# Patient Record
Sex: FEMALE | Race: BLACK OR AFRICAN AMERICAN | NOT HISPANIC OR LATINO | URBAN - METROPOLITAN AREA
[De-identification: names, ages, dates, MRNs, and addresses within clinical notes are randomized per-mention and may not be internally consistent; named-entity substitution may affect disease eponyms.]

---

## 2021-12-10 ENCOUNTER — EMERGENCY (EMERGENCY)
Facility: HOSPITAL | Age: 36
LOS: 1 days | Discharge: HOME | End: 2021-12-10
Admitting: EMERGENCY MEDICINE
Payer: SELF-PAY

## 2021-12-10 VITALS
DIASTOLIC BLOOD PRESSURE: 87 MMHG | SYSTOLIC BLOOD PRESSURE: 142 MMHG | TEMPERATURE: 99 F | OXYGEN SATURATION: 98 % | HEIGHT: 67 IN | WEIGHT: 259.93 LBS | RESPIRATION RATE: 17 BRPM | HEART RATE: 98 BPM

## 2021-12-10 DIAGNOSIS — M54.50 LOW BACK PAIN, UNSPECIFIED: ICD-10-CM

## 2021-12-10 DIAGNOSIS — D57.00 HB-SS DISEASE WITH CRISIS, UNSPECIFIED: ICD-10-CM

## 2021-12-10 DIAGNOSIS — R11.0 NAUSEA: ICD-10-CM

## 2021-12-10 PROCEDURE — 99053 MED SERV 10PM-8AM 24 HR FAC: CPT

## 2021-12-10 PROCEDURE — 99282 EMERGENCY DEPT VISIT SF MDM: CPT

## 2021-12-10 PROCEDURE — 99284 EMERGENCY DEPT VISIT MOD MDM: CPT

## 2021-12-10 NOTE — ED PROVIDER NOTE - NS ED ROS FT
CONSTITUTIONAL: No fever, chills, or weakness  NEURO: No headache, no dizziness, no syncope; No focal weakness/tingling/numbness  EYES: No visual changes  ENT: No rhinorrhea or sore throat  PULM: No cough or dyspnea  CV: No chest pain or palpitations  GI: HPI  : No dysuria, hematuria, frequency  MSK: HPI  SKIN: no rash or unusual bruising

## 2021-12-10 NOTE — ED ADULT NURSE NOTE - OBJECTIVE STATEMENT
36 year old female A&OX4 HX of Sickle Cell. Takes 500mg Hydroxyurea at home. Recently seen at at [facility] ED for same complaint. Denies chest pain, sob, fever, chills, cough,. n/v/d. Ambulates with a steady gait.

## 2021-12-10 NOTE — ED PROVIDER NOTE - NSFOLLOWUPINSTRUCTIONS_ED_ALL_ED_FT
Follow up with your doctor tomorrow.  Return to the Emergency Department if you have any new or worsening symptoms, or if you have any concerns.  ================    Sickle Cell Crisis    WHAT YOU NEED TO KNOW:    A sickle cell crisis is a painful episode that occurs in people who have sickle cell anemia. It happens when sickle-shaped red blood cells (RBCs) block blood vessels. Blood and oxygen cannot get to tissues, causing pain. A sickle cell crisis can also damage your tissues and cause organ failure, such liver or kidney failure. A sickle cell crisis can become life-threatening.    DISCHARGE INSTRUCTIONS:    Call your local emergency number (911 in the ) if:   •You have shortness of breath or chest pain.      •You are a man and have an erection that is painful and does not go away.      •You lose vision in one or both eyes.      Return to the emergency department if:   •You have a fever.      •You feel like you cannot cope with your pain, or you feel like hurting yourself.      •You have behavior changes, a seizure, or faint.      •You have abdominal pain, nausea, or vomiting.      •You have a headache that is worse or different from those that you have had in the past.      •You have new weakness or numbness in your arm, leg, or face.      •You have new pain in any part of your body.      •Your urine is dark and you are urinating less than usual or not at all.      •You are dizzy, lightheaded, or faint.      Call your doctor if:   •You have any new signs or symptoms.      •You have blood in your urine.      •You are constipated or you have diarrhea.      •You have changes in your vision.      •You have increased fatigue.      •You plan to travel by airplane or to a high [facility].      •You have questions or concerns about your condition or care.      Medicines: You may need any of the following:  •Medicine may be given to decrease sickling of your RBCs. You may also need medicine to treat or prevent a bacterial infection.      •Prescription pain medicine may be given. Ask your healthcare provider how to take this medicine safely. Some prescription pain medicines contain acetaminophen. Do not take other medicines that contain acetaminophen without talking to your healthcare provider. Too much acetaminophen may cause liver damage. Prescription pain medicine may cause constipation. Ask your healthcare provider how to prevent or treat constipation.       •NSAIDs, such as ibuprofen, help decrease swelling, pain, and fever. This medicine is available with or without a doctor's order. NSAIDs can cause stomach bleeding or kidney problems in certain people. If you take blood thinner medicine, always ask your healthcare provider if NSAIDs are safe for you. Always read the medicine label and follow directions.      •Acetaminophen decreases pain and fever. It is available without a doctor's order. Ask how much to take and how often to take it. Follow directions. Read the labels of all other medicines you are using to see if they also contain acetaminophen, or ask your doctor or pharmacist. Acetaminophen can cause liver damage if not taken correctly. Do not use more than 4 grams (4,000 milligrams) total of acetaminophen in one day.       •Take your medicine as directed. Contact your healthcare provider if you think your medicine is not helping or if you have side effects. Tell him or her if you are allergic to any medicine. Keep a list of the medicines, vitamins, and herbs you take. Include the amounts, and when and why you take them. Bring the list or the pill bottles to follow-up visits. Carry your medicine list with you in case of an emergency.      Medical alert identification: Wear medical alert jewelry or carry a card that says you have sickle cell anemia. Ask your healthcare provider where to get these items.    Medical Alert Jewelry         Prevent a sickle cell crisis:   •Take vitamins and minerals as directed. Folic acid may help prevent blood vessel problems that can occur with sickle cell anemia. Zinc may decrease how often you have pain.      •Drink liquids as directed. Dehydration can increase your risk for a sickle cell crisis. Ask how much liquid to drink each day and which liquids are best for you.      •Balance rest and exercise. Rest during a sickle cell crisis. Over time, increase your activity to a moderate amount. Exercise as directed. Avoid exercise or activities that can cause injury, such as football. Ask about the best exercise plan for you.      •Wash your hands frequently. Handwashing can help prevent illness. Wash your hands before you prepare or eat food, and after you use the bathroom.  Handwashing           •Avoid quick changes in temperature. Do not go quickly from a warm place to a cold place. Get in a pool slowly instead of jumping in. Avoid getting too hot or too cold. Dress in light clothing in the summer and warm clothing in the winter.      •Do not smoke cigarettes or drink alcohol. These increase your risk for a sickle cell crisis. Nicotine and other chemicals in cigarettes and cigars can cause lung damage. Ask your healthcare provider for information if you currently smoke and need help to quit. E-cigarettes or smokeless tobacco still contain nicotine. Talk to your healthcare provider before you use these products.      •Ask about vaccines you may need. Vaccines can help prevent a viral infection that may lead to a sickle cell crisis. Get a flu shot as soon as recommended each year, usually in September or October. You may need pneumonia vaccines every 5 years. Your healthcare provider can tell you if you need other vaccines, and when to get them.      Follow up with your doctor as directed: You may need ongoing screening for conditions that can develop because of sickle cell disease. Examples include kidney disease, hypertension (high blood pressure), retinopathy (eye problems), and problems with your lungs. Write down your questions so you remember to ask them during your visits.

## 2021-12-10 NOTE — ED PROVIDER NOTE - OBJECTIVE STATEMENT
37 yo fem with Hx sickle cell disease c/o "my usual pain crisis" in low back and hips for the past day.  She says she gets nauseated from her pain and cant keep anything down when this happens.  Last took a dose of dilaudid 10 mg at 5 pm yesterday afternoon.  Says she has vomited several times tonight, and the last time had a "little streak of blood" in it.  No chest pain, cough, hemoptysis, difficulty breathing, fever of chills.  No pain in legs, no weakness in legs, no bowel or bladder dysfunction. She says she is visiting from [place] and up in [place] for a death in the family.  I recognized her from a previous visit apx 2 months ago, when I ordered IM dilaudid at her request for the same symptoms, and she says she remembers me.  However there is no record of this patient in the EMR.  She says that registration misspelled her name the last time I saw her and that she forgot to have them correct it.  She was here another time since then, and again she forgot to have them correct the spelling of her name, so we are unable to find those records tonight in the EMR (she says she doesn't remember how her name was misspelled).

## 2021-12-10 NOTE — ED PROVIDER NOTE - PATIENT PORTAL LINK FT
You can access the FollowMyHealth Patient Portal offered by [facility] by registering at the following website: [email or web address]. By joining [employer]’s FollowMyHealth portal, you will also be able to view your health information using other applications (apps) compatible with our system.

## 2021-12-10 NOTE — ED ADULT TRIAGE NOTE - ARRIVAL INFO ADDITIONAL COMMENTS
Patient reports that she take hydroxyurea 500mg BID , folic acid and Dilaudid 10mg PO by mouth for pain control. Patient reports that she take hydroxyurea 500mg BID , folic acid and Dilaudid 10mg PO by mouth for pain control. Patient reports that symptoms exacerbated yesterday

## 2021-12-10 NOTE — ED PROVIDER NOTE - PHYSICAL EXAMINATION
GENERAL: NAD, sitting in chair comfortably  HEAD: NC/AT  EYE: anicteric  ENT: MMM  CV:  RRR S1S2, not tachycardic  PULM: normal work of breathing, CTAB  GI: obese, soft nontender  :  SKIN: normal color and turgor, no rash  MSK:  no CCE  NEURO: alert with clear sensorium.  speech clear, MAIN, gait steady

## 2021-12-10 NOTE — ED PROVIDER NOTE - CLINICAL SUMMARY MEDICAL DECISION MAKING FREE TEXT BOX
May have sickle cell pain, but highly suspicious for drug-seeking behavior.  States her typical sickle cell pain, no neuro deficits or other red flags for back pain.  I offered to treat her nausea  with zofran IM, and then with dilaudid PO per department protocols.  She says she only wants injectable dilaudid, and that nothing will work otherwise.  She refused this treatment plan.